# Patient Record
Sex: FEMALE | Race: WHITE | Employment: FULL TIME | URBAN - METROPOLITAN AREA
[De-identification: names, ages, dates, MRNs, and addresses within clinical notes are randomized per-mention and may not be internally consistent; named-entity substitution may affect disease eponyms.]

---

## 2017-06-13 ENCOUNTER — HOSPITAL ENCOUNTER (OUTPATIENT)
Dept: MAMMOGRAPHY | Age: 62
Discharge: HOME | End: 2017-06-13
Attending: NURSE PRACTITIONER | Admitting: NURSE PRACTITIONER

## 2017-06-13 DIAGNOSIS — Z91.89 OTHER SPECIFIED PERSONAL RISK FACTORS, NOT ELSEWHERE CLASSIFIED: ICD-10-CM

## 2017-06-13 DIAGNOSIS — Z12.31 ENCOUNTER FOR SCREENING MAMMOGRAM FOR BREAST CANCER: ICD-10-CM

## 2018-08-11 ENCOUNTER — HOSPITAL ENCOUNTER (OUTPATIENT)
Dept: MAMMOGRAPHY | Age: 63
Discharge: HOME | End: 2018-08-11
Attending: NURSE PRACTITIONER | Admitting: NURSE PRACTITIONER

## 2018-08-11 DIAGNOSIS — Z12.39 BREAST CANCER SCREENING: ICD-10-CM

## 2019-01-08 ENCOUNTER — OFFICE VISIT (OUTPATIENT)
Dept: CARDIOLOGY CLINIC | Age: 64
End: 2019-01-08
Payer: COMMERCIAL

## 2019-01-08 VITALS
SYSTOLIC BLOOD PRESSURE: 132 MMHG | WEIGHT: 140.9 LBS | DIASTOLIC BLOOD PRESSURE: 68 MMHG | BODY MASS INDEX: 28.4 KG/M2 | HEART RATE: 76 BPM | HEIGHT: 59 IN

## 2019-01-08 DIAGNOSIS — R06.02 SOB (SHORTNESS OF BREATH) ON EXERTION: ICD-10-CM

## 2019-01-08 DIAGNOSIS — R00.0 RACING HEART BEAT: Primary | ICD-10-CM

## 2019-01-08 DIAGNOSIS — E78.2 MIXED HYPERLIPIDEMIA: ICD-10-CM

## 2019-01-08 PROBLEM — Z79.4 TYPE 2 DIABETES MELLITUS WITHOUT COMPLICATION, WITH LONG-TERM CURRENT USE OF INSULIN (HCC): Status: ACTIVE | Noted: 2019-01-08

## 2019-01-08 PROBLEM — E11.9 TYPE 2 DIABETES MELLITUS WITHOUT COMPLICATION, WITH LONG-TERM CURRENT USE OF INSULIN (HCC): Status: ACTIVE | Noted: 2019-01-08

## 2019-01-08 PROCEDURE — 3017F COLORECTAL CA SCREEN DOC REV: CPT | Performed by: INTERNAL MEDICINE

## 2019-01-08 PROCEDURE — G8484 FLU IMMUNIZE NO ADMIN: HCPCS | Performed by: INTERNAL MEDICINE

## 2019-01-08 PROCEDURE — 93000 ELECTROCARDIOGRAM COMPLETE: CPT | Performed by: INTERNAL MEDICINE

## 2019-01-08 PROCEDURE — G8419 CALC BMI OUT NRM PARAM NOF/U: HCPCS | Performed by: INTERNAL MEDICINE

## 2019-01-08 PROCEDURE — 99244 OFF/OP CNSLTJ NEW/EST MOD 40: CPT | Performed by: INTERNAL MEDICINE

## 2019-01-08 PROCEDURE — G8427 DOCREV CUR MEDS BY ELIG CLIN: HCPCS | Performed by: INTERNAL MEDICINE

## 2019-01-08 RX ORDER — SIMVASTATIN 40 MG
40 TABLET ORAL NIGHTLY
COMMUNITY
End: 2019-01-08 | Stop reason: ALTCHOICE

## 2019-01-08 RX ORDER — ATORVASTATIN CALCIUM 40 MG/1
40 TABLET, FILM COATED ORAL DAILY
Qty: 30 TABLET | Refills: 11 | Status: SHIPPED | OUTPATIENT
Start: 2019-01-08 | End: 2020-01-13

## 2019-01-08 RX ORDER — INSULIN GLARGINE 100 [IU]/ML
22 INJECTION, SOLUTION SUBCUTANEOUS NIGHTLY
COMMUNITY

## 2019-01-08 RX ORDER — GLIPIZIDE 2.5 MG/1
2.5 TABLET, EXTENDED RELEASE ORAL DAILY
COMMUNITY

## 2019-01-08 RX ORDER — LORATADINE 10 MG/1
10 TABLET ORAL DAILY
COMMUNITY
End: 2019-05-09

## 2019-01-25 ENCOUNTER — TELEPHONE (OUTPATIENT)
Dept: CARDIOLOGY CLINIC | Age: 64
End: 2019-01-25

## 2019-02-18 ENCOUNTER — HOSPITAL ENCOUNTER (OUTPATIENT)
Dept: NON INVASIVE DIAGNOSTICS | Age: 64
Discharge: HOME | End: 2019-02-18
Payer: COMMERCIAL

## 2019-02-18 DIAGNOSIS — R06.02 SOB (SHORTNESS OF BREATH) ON EXERTION: ICD-10-CM

## 2019-02-18 DIAGNOSIS — R00.0 RACING HEART BEAT: ICD-10-CM

## 2019-02-18 LAB
LEFT VENTRICULAR EJECTION FRACTION HIGH VALUE: 60 %
LEFT VENTRICULAR EJECTION FRACTION MODE: NORMAL
LV EF: 50 %
LV EF: 60 %
LVEF MODALITY: NORMAL

## 2019-02-18 PROCEDURE — 93351 STRESS TTE COMPLETE: CPT

## 2019-02-18 PROCEDURE — 93320 DOPPLER ECHO COMPLETE: CPT

## 2019-05-07 ENCOUNTER — TELEPHONE (OUTPATIENT)
Dept: CARDIOLOGY CLINIC | Age: 64
End: 2019-05-07

## 2019-05-07 NOTE — TELEPHONE ENCOUNTER
----- Message from Roberta Rangel RN sent at 5/6/2019  1:30 PM EDT -----  [facility] reviewed labs. Labs stable. FU as planned.

## 2019-05-09 ENCOUNTER — OFFICE VISIT (OUTPATIENT)
Dept: CARDIOLOGY CLINIC | Age: 64
End: 2019-05-09
Payer: COMMERCIAL

## 2019-05-09 VITALS
HEIGHT: 59 IN | DIASTOLIC BLOOD PRESSURE: 72 MMHG | SYSTOLIC BLOOD PRESSURE: 138 MMHG | WEIGHT: 147.2 LBS | BODY MASS INDEX: 29.68 KG/M2 | HEART RATE: 80 BPM

## 2019-05-09 DIAGNOSIS — R00.0 RACING HEART BEAT: Primary | ICD-10-CM

## 2019-05-09 DIAGNOSIS — R06.02 SOB (SHORTNESS OF BREATH) ON EXERTION: ICD-10-CM

## 2019-05-09 DIAGNOSIS — E78.2 MIXED HYPERLIPIDEMIA: ICD-10-CM

## 2019-05-09 PROCEDURE — 3017F COLORECTAL CA SCREEN DOC REV: CPT | Performed by: NURSE PRACTITIONER

## 2019-05-09 PROCEDURE — 1036F TOBACCO NON-USER: CPT | Performed by: NURSE PRACTITIONER

## 2019-05-09 PROCEDURE — 99214 OFFICE O/P EST MOD 30 MIN: CPT | Performed by: NURSE PRACTITIONER

## 2019-05-09 PROCEDURE — G8427 DOCREV CUR MEDS BY ELIG CLIN: HCPCS | Performed by: NURSE PRACTITIONER

## 2019-05-09 PROCEDURE — G8419 CALC BMI OUT NRM PARAM NOF/U: HCPCS | Performed by: NURSE PRACTITIONER

## 2019-05-09 NOTE — LETTER
[address] [address] [address] [id number]  Phone: [phone]  Fax: [phone]      May 16, 2019     ANDREAS Olsen - CNP  [address] [id number]  [facility] [id number]    Patient: Lisa Wade  MR Number: [id number]  YOB: 1955  Date of Visit: 5/9/2019    Dear Kamari Montgomery:    Sabrina 82     Outpatient Follow Up Note    Lisa Wade is 62 y.o. female who presents today with a history of CP, HUIZAR, palpitations and HTN. CHIEF COMPLAINT / HPI:  Follow Up secondary to HUIZAR / palpitations    Subjective:   She's had a racing heart beat which has gotten better after changing cholesterol pills. She notices it in her ears when she's sitting quietly or laying down at HS. She denies significant chest pain. Sometimes though she has heaviness in her chest which could be from a number of things. If her BS is either too high or low, she feels bad. She's thinking depression may even be a factor. There is SOB, ie reaching for things, helping her daughter dress, lifting her w/c out of the car. She can't go very far walking in the neighborhood. She can walk ~ 1/2 block before feeling winded; inclines are hard. The patient denies orthopnea/PND. She sleeps with 2 pillows to breath easier. Her  says she snores. She doesn't sleep sound listening to her daughter (hx of seizures and she is non-verbal; she had 15-20 episode over the past month). The patient does not have swelling. The patients weight is fluctuates and has gained a few . The patient is not experiencing palpitations or dizziness. She gets light headed a lot (she is on the speaker at work and by the end of the day shift, her sugar has dropped; she has no opportunity for lunch [works at [employer])  These symptoms have improved since the last OV in regards to feeling less of a fast heart beat. With regard to medication therapy the patient has been compliant with prescribed regimen. They have tolerated therapy to date. Current Outpatient Medications   Medication Sig Dispense Refill    Cetirizine HCl 10 MG CAPS Take 1 tablet by mouth      insulin glargine (LANTUS) 100 UNIT/ML injection vial Inject 22 Units into the skin nightly      glipiZIDE (GLUCOTROL XL) 2.5 MG extended release tablet Take 2.5 mg by mouth daily      saxagliptin (ONGLYZA) 5 MG TABS tablet Take 5 mg by mouth daily      aspirin 81 MG tablet Take 81 mg by mouth daily      atorvastatin (LIPITOR) 40 MG tablet Take 1 tablet by mouth daily 30 tablet 11         Objective:   PHYSICAL EXAM:       Vitals:    05/09/19 1442 05/09/19 1515   BP: 118/68 138/72   Pulse: 80    Weight: 147 lb 3.2 oz (66.8 kg)    Height: 4' 11\" (1.499 m)         VITALS:  /68   Pulse 80   Ht 4' 11\" (1.499 m)   Wt 147 lb 3.2 oz (66.8 kg)   BMI 29.73 kg/m²    CONSTITUTIONAL: Cooperative, no apparent distress, and appears well nourished / developed  NEUROLOGIC:  Awake and orientated to person, place and time. PSYCH: Calm affect. SKIN: Warm and dry. HEENT: Sclera non-icteric, normocephalic, neck supple, no elevation of JVP, normal carotid pulses with no bruits and thyroid normal size. LUNGS:  No increased work of breathing and clear to auscultation, no crackles or wheezing  CARDIOVASCULAR:  Regular rate 88 and rhythm with no murmurs, gallops, rubs, or abnormal heart sounds, normal PMI. The apical impulses not displaced  JVP less than 8 cm H2O  Heart tones are crisp and normal  Cervical veins are not engorged  The carotid upstroke is normal in amplitude and contour without delay or bruit  JVP is not elevated  ABDOMEN:  Normal bowel sounds, non-distended and non-tender to palpation  EXT: No edema, no calf tenderness. Pulses are present bilaterally.     DATA:    Lab Results   Component Value Date    ALT 23 05/04/2019     Lab Results   Component Value Date TRIG 52 05/04/2019     Lab Results   Component Value Date    HDL 67 05/04/2019     Lab Results   Component Value Date    [address] 95 05/04/2019     Radiology Review:  Pertinent images / reports were reviewed as a part of this visit and reveals the following:    Stress Echo: Feb '19  Summary     Target heart rate not achieved (77% predicted)     No EKG evidence for ischemia at subtarget heart rate     No echocardiographic evidence for ischemia at subtarget heart rate.     Rest      ECG   NSR      Stress      Stress Type: Exercise      Stress Protocol: Lary      Rest HR: 76 bpm                 HR BP Product: [id number]   Rest BP: 134/74 mmHg            Max Exercise: 2 METS   Stress Peak HR: 133 bpm   Stress Peak BP: 145/75 mmHg   Predicted HR: 157 bpm   % of predicted HR: 85   Test Duration: 3 min and 35 sec   Reason for Termination: Fatigue      Results      Echo (rest): Normal (LVEF >50%)      Echo (stress): Hyperkinetic (LVEF >70%)      Echo   Target heart rate was not achieve.   Baseline resting echocardiogram shows normal global LV systolic function   with an ejection fraction of 55-60% and uniform myocardial segmental wall   motion. Following stress there was uniform augmentation of all myocardial   segments with appropriate hyperdynamic LV systolic response to stress.      ECG   Sinus tachycardia, <1mm ST depression      Symptoms   There was stress induced leg fatigue.   Symptoms resolved with rest.         Echo Summary  Left ventricle - normal size, thickness and function with EF of 60%  Mitral valve - trivial regurgitation  Tricuspid valve - trivial regurgitation   Pericardium - small effusion    US thyroid: Dec '18  THYROID GLAND:  Diffusely heterogeneous and enlarged thyroid with diffuse bilateral, similar-appearing, conglomerate thyroid nodules, consistent with multinodular goiter.   No suspicious focal or dominant mass lesion  IMPRESSION  Benign-appearing multinodular goiter without suspicious mass Assessment:      Diagnosis Orders   1. Racing heart beat   ~improved which she attributes to change in statin  ~AP 88 ; ST with stress echo  ~TSH 0.753    2. SOB (shortness of breath) on exertion   ~unchanged   ~EF 60% with normal LVF  ~small pericardial effusion noted on echo    3. Mixed hyperlipidemia   ~well controlled on atorvastatin      I had the opportunity to review the clinical symptoms and presentation of Juanjo Medina. Plan:     1. Consider a 24 hr Holter   Consider a sleep study  2. F/U in 6 months    ~she prefers not to have additional test at this time as overwhelmed taking care of daughter (multiple provider appts)    Overall the patient is stable from CV standpoint    Thank you for allowing to us to participate in the care of Juanjo Medina. If you have questions, please do not hesitate to call me. I look forward to following David Smoke along with you.     Sincerely,      Svetlana Salmon, APRN - CNP

## 2019-05-09 NOTE — PROGRESS NOTES
Outpatient Medications   Medication Sig Dispense Refill    Cetirizine HCl 10 MG CAPS Take 1 tablet by mouth      insulin glargine (LANTUS) 100 UNIT/ML injection vial Inject 22 Units into the skin nightly      glipiZIDE (GLUCOTROL XL) 2.5 MG extended release tablet Take 2.5 mg by mouth daily      saxagliptin (ONGLYZA) 5 MG TABS tablet Take 5 mg by mouth daily      aspirin 81 MG tablet Take 81 mg by mouth daily      atorvastatin (LIPITOR) 40 MG tablet Take 1 tablet by mouth daily 30 tablet 11     No current facility-administered medications for this visit. REVIEW OF SYSTEMS:    CONSTITUTIONAL: No major weight gain or loss, fatigue, weakness, night sweats or fever. HEENT: No new vision difficulties or ringing in the ears. RESPIRATORY: No new SOB, PND, orthopnea or cough. CARDIOVASCULAR: See HPI  GI: No nausea, vomiting, diarrhea, constipation, abdominal pain or changes in bowel habits. : No urinary frequency, urgency, incontinence hematuria or dysuria. SKIN: No cyanosis or skin lesions. MUSCULOSKELETAL: No new muscle or joint pain. NEUROLOGICAL: No syncope or TIA-like symptoms. PSYCHIATRIC: No anxiety, pain, insomnia or depression    Objective:   PHYSICAL EXAM:       Vitals:    05/09/19 1442 05/09/19 1515   BP: 118/68 138/72   Pulse: 80    Weight: 147 lb 3.2 oz (66.8 kg)    Height: 4' 11\" (1.499 m)         VITALS:  /68   Pulse 80   Ht 4' 11\" (1.499 m)   Wt 147 lb 3.2 oz (66.8 kg)   BMI 29.73 kg/m²   CONSTITUTIONAL: Cooperative, no apparent distress, and appears well nourished / developed  NEUROLOGIC:  Awake and orientated to person, place and time. PSYCH: Calm affect. SKIN: Warm and dry. HEENT: Sclera non-icteric, normocephalic, neck supple, no elevation of JVP, normal carotid pulses with no bruits and thyroid normal size.   LUNGS:  No increased work of breathing and clear to auscultation, no crackles or wheezing  CARDIOVASCULAR:  Regular rate 88 and rhythm with no murmurs, gallops, rubs, or abnormal heart sounds, normal PMI. The apical impulses not displaced  JVP less than 8 cm H2O  Heart tones are crisp and normal  Cervical veins are not engorged  The carotid upstroke is normal in amplitude and contour without delay or bruit  JVP is not elevated  ABDOMEN:  Normal bowel sounds, non-distended and non-tender to palpation  EXT: No edema, no calf tenderness. Pulses are present bilaterally. DATA:    Lab Results   Component Value Date    ALT 23 05/04/2019    AST 17 05/04/2019    ALKPHOS 60 06/12/2010    BILITOT 0.40 06/12/2010     Lab Results   Component Value Date    CREATININE 0.8 12/27/2010    BUN 19 (H) 12/27/2010     12/27/2010    K 3.3 (L) 12/27/2010     12/27/2010    CO2 28 12/27/2010     Lab Results   Component Value Date    TSH 0.95 06/12/2010     No components found for: CHLPL  Lab Results   Component Value Date    TRIG 52 05/04/2019    TRIG 59 06/12/2010     Lab Results   Component Value Date    HDL 67 05/04/2019    HDL 81 (H) 06/12/2010     Lab Results   Component Value Date    LDLCALC 95 05/04/2019    LDLCALC 127 (H) 06/12/2010     Lab Results   Component Value Date    LABVLDL 12 06/12/2010     Radiology Review:  Pertinent images / reports were reviewed as a part of this visit and reveals the following:    Stress Echo: Feb '19  Summary     Target heart rate not achieved (77% predicted)     No EKG evidence for ischemia at subtarget heart rate     No echocardiographic evidence for ischemia at subtarget heart rate.     Rest      ECG   NSR      Stress      Stress Type: Exercise      Stress Protocol: Lary      Rest HR: 76 bpm                 HR BP Product: [id number]   Rest BP: 134/74 mmHg            Max Exercise: 2 METS   Stress Peak HR: 133 bpm   Stress Peak BP: 145/75 mmHg   Predicted HR: 157 bpm   % of predicted HR: 85   Test Duration: 3 min and 35 sec   Reason for Termination: Fatigue      Results      Echo (rest): Normal (LVEF >50%)      Echo (stress):  Hyperkinetic (LVEF clinical course and testing results. All questions and concerns were addressed to the patient. Alternatives to my treatment were discussed. The patient is not currently smoking. The risks related to smoking were reviewed with the patient. Recommend maintaining a smoke-free lifestyle. Patient is not on a beta-blocker : neg MI  Patient is not on an ace-i/ARB : neg CHF  Patient is on a statin    antiplatelet therapy has been recommended / prescribed for this patient. Education conducted on adverse reactions including bleeding was discussed. The patient verbalizes understanding not to stop medications without discussing with us. Discussed exercise: 30-60 minutes 7 days/week  Discussed Low saturated fat/FROYLAN diet. A1c 6.9% Feb '19; follows with endocrinologist, Dr. Zach Franco    Thank you for allowing to us to participate in the care of James Linares.     ANDREAS Ellis    Documentation of today's visit sent to PCP

## 2019-05-16 NOTE — COMMUNICATION BODY
[facility]     Outpatient Follow Up Note    Delma Johnson is 62 y.o. female who presents today with a history of CP, HUIZAR, palpitations and HTN. CHIEF COMPLAINT / HPI:  Follow Up secondary to HUIZAR / palpitations    Subjective:   She's had a racing heart beat which has gotten better after changing cholesterol pills. She notices it in her ears when she's sitting quietly or laying down at HS. She denies significant chest pain. Sometimes though she has heaviness in her chest which could be from a number of things. If her BS is either too high or low, she feels bad. She's thinking depression may even be a factor. There is SOB, ie reaching for things, helping her daughter dress, lifting her w/c out of the car. She can't go very far walking in the neighborhood. She can walk ~ 1/2 block before feeling winded; inclines are hard. The patient denies orthopnea/PND. She sleeps with 2 pillows to breath easier. Her  says she snores. She doesn't sleep sound listening to her daughter (hx of seizures and she is non-verbal; she had 15-20 episode over the past month). The patient does not have swelling. The patients weight is fluctuates and has gained a few . The patient is not experiencing palpitations or dizziness. She gets light headed a lot (she is on the speaker at work and by the end of the day shift, her sugar has dropped; she has no opportunity for lunch [works at [employer])  These symptoms have improved since the last OV in regards to feeling less of a fast heart beat. With regard to medication therapy the patient has been compliant with prescribed regimen. They have tolerated therapy to date.      Current Outpatient Medications   Medication Sig Dispense Refill    Cetirizine HCl 10 MG CAPS Take 1 tablet by mouth      insulin glargine (LANTUS) 100 UNIT/ML injection vial Inject 22 Units into the skin nightly      glipiZIDE (GLUCOTROL XL) 2.5 MG extended release tablet Take 2.5 mg by mouth daily  saxagliptin (ONGLYZA) 5 MG TABS tablet Take 5 mg by mouth daily      aspirin 81 MG tablet Take 81 mg by mouth daily      atorvastatin (LIPITOR) 40 MG tablet Take 1 tablet by mouth daily 30 tablet 11         Objective:   PHYSICAL EXAM:       Vitals:    05/09/19 1442 05/09/19 1515   BP: 118/68 138/72   Pulse: 80    Weight: 147 lb 3.2 oz (66.8 kg)    Height: 4' 11\" (1.499 m)         VITALS:  /68   Pulse 80   Ht 4' 11\" (1.499 m)   Wt 147 lb 3.2 oz (66.8 kg)   BMI 29.73 kg/m²    CONSTITUTIONAL: Cooperative, no apparent distress, and appears well nourished / developed  NEUROLOGIC:  Awake and orientated to person, place and time. PSYCH: Calm affect. SKIN: Warm and dry. HEENT: Sclera non-icteric, normocephalic, neck supple, no elevation of JVP, normal carotid pulses with no bruits and thyroid normal size. LUNGS:  No increased work of breathing and clear to auscultation, no crackles or wheezing  CARDIOVASCULAR:  Regular rate 88 and rhythm with no murmurs, gallops, rubs, or abnormal heart sounds, normal PMI. The apical impulses not displaced  JVP less than 8 cm H2O  Heart tones are crisp and normal  Cervical veins are not engorged  The carotid upstroke is normal in amplitude and contour without delay or bruit  JVP is not elevated  ABDOMEN:  Normal bowel sounds, non-distended and non-tender to palpation  EXT: No edema, no calf tenderness. Pulses are present bilaterally.     DATA:    Lab Results   Component Value Date    ALT 23 05/04/2019     Lab Results   Component Value Date    TRIG 52 05/04/2019     Lab Results   Component Value Date    HDL 67 05/04/2019     Lab Results   Component Value Date    [address] 95 05/04/2019     Radiology Review:  Pertinent images / reports were reviewed as a part of this visit and reveals the following:    Stress Echo: Feb '19  Summary     Target heart rate not achieved (77% predicted)     No EKG evidence for ischemia at subtarget heart rate     No echocardiographic evidence for ischemia at subtarget heart rate.     Rest      ECG   NSR      Stress      Stress Type: Exercise      Stress Protocol: Lary      Rest HR: 76 bpm                 HR BP Product: [id number]   Rest BP: 134/74 mmHg            Max Exercise: 2 METS   Stress Peak HR: 133 bpm   Stress Peak BP: 145/75 mmHg   Predicted HR: 157 bpm   % of predicted HR: 85   Test Duration: 3 min and 35 sec   Reason for Termination: Fatigue      Results      Echo (rest): Normal (LVEF >50%)      Echo (stress): Hyperkinetic (LVEF >70%)      Echo   Target heart rate was not achieve.   Baseline resting echocardiogram shows normal global LV systolic function   with an ejection fraction of 55-60% and uniform myocardial segmental wall   motion. Following stress there was uniform augmentation of all myocardial   segments with appropriate hyperdynamic LV systolic response to stress.      ECG   Sinus tachycardia, <1mm ST depression      Symptoms   There was stress induced leg fatigue.   Symptoms resolved with rest.         Echo Summary  Left ventricle - normal size, thickness and function with EF of 60%  Mitral valve - trivial regurgitation  Tricuspid valve - trivial regurgitation   Pericardium - small effusion    US thyroid: Dec '18  THYROID GLAND:  Diffusely heterogeneous and enlarged thyroid with diffuse bilateral, similar-appearing, conglomerate thyroid nodules, consistent with multinodular goiter. No suspicious focal or dominant mass lesion  IMPRESSION  Benign-appearing multinodular goiter without suspicious mass    Assessment:      Diagnosis Orders   1. Racing heart beat   ~improved which she attributes to change in statin  ~AP 88 ; ST with stress echo  ~TSH 0.753    2. SOB (shortness of breath) on exertion   ~unchanged   ~EF 60% with normal LVF  ~small pericardial effusion noted on echo    3. Mixed hyperlipidemia   ~well controlled on atorvastatin          I had the opportunity to review the clinical symptoms and presentation of Delma Johnson. Plan:     1. Consider a 24 hr Holter   Consider a sleep study  2. F/U in 6 months    ~she prefers not to have additional test at this time as overwhelmed taking care of daughter (multiple provider appts)    Overall the patient is stable from CV standpoint    Thank you for allowing to us to participate in the care of Popeye Perez.     ANDREAS Curry

## 2020-01-14 RX ORDER — ATORVASTATIN CALCIUM 40 MG/1
TABLET, FILM COATED ORAL
Qty: 30 TABLET | Refills: 4 | OUTPATIENT
Start: 2020-01-14

## 2020-08-21 ENCOUNTER — OFFICE VISIT (OUTPATIENT)
Dept: PRIMARY CARE CLINIC | Age: 65
End: 2020-08-21
Payer: COMMERCIAL

## 2020-08-21 PROCEDURE — G8421 BMI NOT CALCULATED: HCPCS | Performed by: NURSE PRACTITIONER

## 2020-08-21 PROCEDURE — 99211 OFF/OP EST MAY X REQ PHY/QHP: CPT | Performed by: NURSE PRACTITIONER

## 2020-08-21 PROCEDURE — G8428 CUR MEDS NOT DOCUMENT: HCPCS | Performed by: NURSE PRACTITIONER

## 2020-08-21 NOTE — PATIENT INSTRUCTIONS
You have received a viral test for COVID-19. Below is education on quarantine per the CDC guidelines. For any symptoms, seek care from your PCP, call [phone] to establish care with a doctor, or go directly to an urgent care or the emergency room. Test results will take 2-7 days and will be sent to you in your [employer] account. If you test positive, you will be contacted via phone. If you test negative, the ONLY communication will be through [address] GO TO [employer] AND SIGN UP FOR [employer]  (LOWER LEFT OF THE HOME PAGE)  No test is 100%. If you have symptoms, you should follow the guidance of quarantine as previously stated. You can still be contagious if you have symptoms. Your [facility] will reach out to you if you have a positive result. They will provide you with a return to work date and note. If you were tested for a pre-op, then you should remain in quarantine until your procedure. How do I know if I need to be in quarantine? If you live in a community where COVID-19 is or might be spreading (currently, that is virtually everywhere in the [place])  Be alert for symptoms. Watch for fever, cough, shortness of breath, or other symptoms of COVID-19.  Take your temperature if symptoms develop.  Practice social distancing. Maintain 6 feet of distance from others and stay out of crowded places.  Follow CDC guidance if symptoms develop. If you feel healthy but:   Recently had close contact with a person with COVID-19 you need to Quarantine:   Stay home until 14 days after your last exposure.  Check your temperature twice a day and watch for symptoms of COVID-19.  If possible, stay away from people who are at higher-risk for getting very sick from COVID-19.   Stay Home and Monitor Your Health if you:   Have been diagnosed with COVID-19, or   Are waiting for test results, or   Have cough, fever, or shortness of breath, or symptoms of COVID-19      When You Can

## 2020-08-21 NOTE — PROGRESS NOTES
Emiliano Szymanski received a viral test for COVID-19. They were educated on isolation and quarantine as appropriate. For any symptoms, they were directed to seek care from their PCP, given contact information to establish with a doctor, directed to an urgent care or the emergency room.

## 2020-08-24 LAB — SARS-COV-2, NAA: DETECTED

## 2020-08-25 NOTE — RESULT ENCOUNTER NOTE
The patient was called for notification of a POSITIVE test result for COVID-19. The following information was given to the patient:    The COVID-19 test result was positive  Treatment of coronavirus does not require an antibiotic    Remain isolated for 10 days minimum or 72 hours after your symptoms have completely resolved, whichever is longer. Wash hands often with soap and water for at least 20 seconds or alternatively use hand  with at least 60% alcohol content    Cover coughs and sneezes    Wear a mask when around others if possible    Clean all \"high-touch\" surfaces every day, such as doorknobs and cellphones    Continually monitor symptoms. Contact a medical provider if symptoms are worsening, such as difficulty breathing. Your local health department will reach out to you and instruct you on your return to work and the general public. Anyone that lives in the home, or had contact with you, should be in quarantine for 14 days, even with a negative COVID-19 test.    For additional information, please visit the Centers for Disease Control and Prevention at  [facility]

## 2020-09-29 ENCOUNTER — TELEPHONE (OUTPATIENT)
Dept: ADMINISTRATIVE | Age: 65
End: 2020-09-29